# Patient Record
Sex: FEMALE | Race: WHITE
[De-identification: names, ages, dates, MRNs, and addresses within clinical notes are randomized per-mention and may not be internally consistent; named-entity substitution may affect disease eponyms.]

---

## 2017-09-05 ENCOUNTER — HOSPITAL ENCOUNTER (OUTPATIENT)
Dept: HOSPITAL 53 - M ADAMS | Age: 32
End: 2017-09-05
Attending: PHYSICIAN ASSISTANT
Payer: COMMERCIAL

## 2017-09-05 DIAGNOSIS — R59.0: Primary | ICD-10-CM

## 2017-09-05 DIAGNOSIS — M79.1: ICD-10-CM

## 2017-09-05 DIAGNOSIS — R53.83: ICD-10-CM

## 2017-09-05 LAB
ALBUMIN SERPL BCG-MCNC: 3.5 GM/DL (ref 3.2–5.2)
ALBUMIN/GLOB SERPL: 0.9 {RATIO} (ref 1–1.93)
ALP SERPL-CCNC: 78 U/L (ref 45–117)
ALT SERPL W P-5'-P-CCNC: 17 U/L (ref 12–78)
ANION GAP SERPL CALC-SCNC: 13 MEQ/L (ref 8–16)
AST SERPL-CCNC: 12 U/L (ref 15–37)
BASOPHILS # BLD AUTO: 0 K/MM3 (ref 0–0.2)
BASOPHILS NFR BLD AUTO: 0.3 % (ref 0–1)
BILIRUB SERPL-MCNC: 0.2 MG/DL (ref 0.2–1)
BUN SERPL-MCNC: 15 MG/DL (ref 7–18)
CALCIUM SERPL-MCNC: 8.5 MG/DL (ref 8.5–10.1)
CHLORIDE SERPL-SCNC: 106 MEQ/L (ref 98–107)
CO2 SERPL-SCNC: 24 MEQ/L (ref 21–32)
CREAT SERPL-MCNC: 0.74 MG/DL (ref 0.55–1.02)
EOSINOPHIL # BLD AUTO: 0 K/MM3 (ref 0–0.5)
EOSINOPHIL NFR BLD AUTO: 0.5 % (ref 0–3)
ERYTHROCYTE [DISTWIDTH] IN BLOOD BY AUTOMATED COUNT: 12.1 % (ref 11.5–14.5)
GFR SERPL CREATININE-BSD FRML MDRD: > 60 ML/MIN/{1.73_M2} (ref 60–?)
GLUCOSE SERPL-MCNC: 124 MG/DL (ref 70–105)
LARGE UNSTAINED CELL #: 0.1 K/MM3 (ref 0–0.4)
LARGE UNSTAINED CELL %: 1.3 % (ref 0–4)
LYMPHOCYTES # BLD AUTO: 1.9 K/MM3 (ref 1.5–4.5)
LYMPHOCYTES NFR BLD AUTO: 20.4 % (ref 24–44)
MCH RBC QN AUTO: 31 PG (ref 27–33)
MCHC RBC AUTO-ENTMCNC: 35.6 G/DL (ref 32–36.5)
MCV RBC AUTO: 87 FL (ref 80–96)
MONOCYTES # BLD AUTO: 0.4 K/MM3 (ref 0–0.8)
MONOCYTES NFR BLD AUTO: 4.3 % (ref 0–5)
NEUTROPHILS # BLD AUTO: 6.7 K/MM3 (ref 1.8–7.7)
NEUTROPHILS NFR BLD AUTO: 73.2 % (ref 36–66)
PLATELET # BLD AUTO: 262 K/MM3 (ref 150–450)
POTASSIUM SERPL-SCNC: 4 MEQ/L (ref 3.5–5.1)
PROT SERPL-MCNC: 7.4 GM/DL (ref 6.4–8.2)
SODIUM SERPL-SCNC: 143 MEQ/L (ref 136–145)
WBC # BLD AUTO: 9.1 K/MM3 (ref 4–10)

## 2017-09-06 LAB — CONTROL LINE MONO: (no result)

## 2017-09-08 LAB
B BURGDOR IGG+IGM SER-ACNC: <0.91 ISR (ref 0–0.9)
B BURGDOR IGM SER IA-ACNC: <0.8 INDEX (ref 0–0.79)

## 2017-09-10 ENCOUNTER — HOSPITAL ENCOUNTER (EMERGENCY)
Dept: HOSPITAL 53 - M ED | Age: 32
LOS: 1 days | Discharge: HOME | End: 2017-09-11
Payer: COMMERCIAL

## 2017-09-10 VITALS — BODY MASS INDEX: 28 KG/M2 | WEIGHT: 148.3 LBS | HEIGHT: 61 IN

## 2017-09-10 DIAGNOSIS — N39.0: Primary | ICD-10-CM

## 2017-09-11 VITALS — DIASTOLIC BLOOD PRESSURE: 88 MMHG | SYSTOLIC BLOOD PRESSURE: 123 MMHG

## 2017-11-12 ENCOUNTER — HOSPITAL ENCOUNTER (EMERGENCY)
Dept: HOSPITAL 53 - M ED | Age: 32
Discharge: HOME | End: 2017-11-12
Payer: COMMERCIAL

## 2017-11-12 VITALS — WEIGHT: 155.32 LBS | HEIGHT: 61 IN | BODY MASS INDEX: 29.32 KG/M2

## 2017-11-12 VITALS — SYSTOLIC BLOOD PRESSURE: 159 MMHG | DIASTOLIC BLOOD PRESSURE: 85 MMHG

## 2017-11-12 DIAGNOSIS — N10: Primary | ICD-10-CM

## 2017-11-12 DIAGNOSIS — N39.0: ICD-10-CM

## 2017-12-07 ENCOUNTER — HOSPITAL ENCOUNTER (OUTPATIENT)
Dept: HOSPITAL 53 - M SFHCADAM | Age: 32
End: 2017-12-07
Attending: PHYSICIAN ASSISTANT
Payer: COMMERCIAL

## 2017-12-07 DIAGNOSIS — R35.0: Primary | ICD-10-CM

## 2017-12-07 DIAGNOSIS — M54.5: ICD-10-CM

## 2018-01-18 ENCOUNTER — HOSPITAL ENCOUNTER (OUTPATIENT)
Dept: HOSPITAL 53 - M SFHCADAM | Age: 33
End: 2018-01-18
Attending: PHYSICIAN ASSISTANT
Payer: COMMERCIAL

## 2018-01-18 DIAGNOSIS — Z83.3: Primary | ICD-10-CM

## 2018-01-18 LAB
25(OH)D3 SERPL-MCNC: 12 NG/ML (ref 30–100)
ALBUMIN/GLOBULIN RATIO: 1 (ref 1–1.93)
ALBUMIN: 3.9 GM/DL (ref 3.2–5.2)
ALKALINE PHOSPHATASE: 76 U/L (ref 45–117)
ALT SERPL W P-5'-P-CCNC: 12 U/L (ref 12–78)
ANION GAP: 7 MEQ/L (ref 8–16)
AST SERPL-CCNC: 11 U/L (ref 7–37)
BASO #: 0 10^3/UL (ref 0–0.2)
BASO %: 0.1 % (ref 0–1)
BILIRUBIN,TOTAL: 0.3 MG/DL (ref 0.2–1)
BLOOD UREA NITROGEN: 10 MG/DL (ref 7–18)
CALCIUM LEVEL: 9 MG/DL (ref 8.5–10.1)
CARBON DIOXIDE LEVEL: 29 MEQ/L (ref 21–32)
CHLORIDE LEVEL: 104 MEQ/L (ref 98–107)
CHOLEST SERPL-MCNC: 204 MG/DL (ref ?–200)
CHOLESTEROL RISK RATIO: 3.92 (ref ?–5)
CREATININE FOR GFR: 0.74 MG/DL (ref 0.55–1.02)
EOS #: 0 10^3/UL (ref 0–0.5)
EOSINOPHIL NFR BLD AUTO: 0.6 % (ref 0–3)
FREE T4: 0.96 NG/DL (ref 0.76–1.46)
GFR SERPL CREATININE-BSD FRML MDRD: > 60 ML/MIN/{1.73_M2} (ref 60–?)
GLUCOSE, FASTING: 98 MG/DL (ref 70–105)
HDLC SERPL-MCNC: 52 MG/DL (ref 40–?)
HEMATOCRIT: 42.1 % (ref 36–47)
HEMOGLOBIN: 13.5 G/DL (ref 12–16)
IMMATURE GRANULOCYTE #: 0 10^3/UL (ref 0–0)
IMMATURE GRANULOCYTE %: 0.3 % (ref 0–0)
LDL CHOLESTEROL: 111.6 MG/DL (ref ?–100)
LYMPH #: 1.3 10^3/UL (ref 1.5–4.5)
LYMPH %: 19.2 % (ref 24–44)
MEAN CORPUSCULAR HEMOGLOBIN: 28.5 PG (ref 27–33)
MEAN CORPUSCULAR HGB CONC: 32.1 G/DL (ref 32–36.5)
MEAN CORPUSCULAR VOLUME: 88.8 FL (ref 80–96)
MONO #: 0.4 10^3/UL (ref 0–0.8)
MONO %: 5.1 % (ref 0–5)
NEUTROPHILS #: 5.1 10^3/UL (ref 1.8–7.7)
NEUTROPHILS %: 74.7 % (ref 36–66)
NONHDLC SERPL-MCNC: 152 MG/DL
NRBC BLD AUTO-RTO: 0 % (ref 0–0)
PLATELET COUNT, AUTOMATED: 315 10^3/UL (ref 150–450)
POTASSIUM SERUM: 4.2 MEQ/L (ref 3.5–5.1)
RED BLOOD COUNT: 4.74 10^6/UL (ref 4–5.4)
RED CELL DISTRIBUTION WIDTH: 11.9 % (ref 11.5–14.5)
SODIUM LEVEL: 140 MEQ/L (ref 136–145)
THYROID STIMULATING HORMONE: 1.03 UIU/ML (ref 0.36–3.74)
TOTAL PROTEIN: 7.8 GM/DL (ref 6.4–8.2)
TRIGLYCERIDES LEVEL: 202 MG/DL (ref ?–150)
WHITE BLOOD COUNT: 6.8 10^3/UL (ref 4–10)

## 2018-01-29 ENCOUNTER — HOSPITAL ENCOUNTER (OUTPATIENT)
Dept: HOSPITAL 53 - M RAD | Age: 33
End: 2018-01-29
Attending: PHYSICIAN ASSISTANT
Payer: COMMERCIAL

## 2018-01-29 DIAGNOSIS — R93.3: ICD-10-CM

## 2018-01-29 DIAGNOSIS — R13.10: Primary | ICD-10-CM

## 2018-01-29 PROCEDURE — 74241: CPT

## 2018-11-23 ENCOUNTER — HOSPITAL ENCOUNTER (OUTPATIENT)
Dept: HOSPITAL 53 - M LAB REF | Age: 33
End: 2018-11-23
Attending: PHYSICIAN ASSISTANT
Payer: COMMERCIAL

## 2018-11-23 DIAGNOSIS — N39.0: Primary | ICD-10-CM

## 2018-11-28 ENCOUNTER — HOSPITAL ENCOUNTER (EMERGENCY)
Dept: HOSPITAL 53 - M ED | Age: 33
Discharge: HOME | End: 2018-11-28
Payer: COMMERCIAL

## 2018-11-28 DIAGNOSIS — Z79.3: ICD-10-CM

## 2018-11-28 DIAGNOSIS — F41.1: ICD-10-CM

## 2018-11-28 DIAGNOSIS — K21.9: Primary | ICD-10-CM

## 2018-11-28 DIAGNOSIS — Z79.899: ICD-10-CM

## 2018-11-28 DIAGNOSIS — R00.0: ICD-10-CM

## 2018-11-28 LAB
ALBUMIN/GLOBULIN RATIO: 0.8 (ref 1–1.93)
ALBUMIN: 3.7 GM/DL (ref 3.2–5.2)
ALKALINE PHOSPHATASE: 72 U/L (ref 45–117)
ALT SERPL W P-5'-P-CCNC: 16 U/L (ref 12–78)
ANION GAP: 8 MEQ/L (ref 8–16)
AST SERPL-CCNC: 12 U/L (ref 7–37)
BASO #: 0 10^3/UL (ref 0–0.2)
BASO %: 0.3 % (ref 0–1)
BILIRUB CONJ SERPL-MCNC: < 0.1 MG/DL (ref 0–0.2)
BILIRUBIN,TOTAL: < 0.1 MG/DL (ref 0.2–1)
BLOOD UREA NITROGEN: 11 MG/DL (ref 7–18)
CALCIUM LEVEL: 9.1 MG/DL (ref 8.5–10.1)
CARBON DIOXIDE LEVEL: 24 MEQ/L (ref 21–32)
CHLORIDE LEVEL: 107 MEQ/L (ref 98–107)
CK MB CFR.DF SERPL CALC: 0.77
CK SERPL-CCNC: 130 U/L (ref 26–192)
CK-MB VALUE MASS: < 1 NG/ML (ref ?–3.6)
CREATININE FOR GFR: 0.73 MG/DL (ref 0.55–1.3)
EOS #: 0 10^3/UL (ref 0–0.5)
EOSINOPHIL NFR BLD AUTO: 0.1 % (ref 0–3)
GFR SERPL CREATININE-BSD FRML MDRD: > 60 ML/MIN/{1.73_M2} (ref 60–?)
GLUCOSE, FASTING: 128 MG/DL (ref 70–100)
HEMATOCRIT: 39.3 % (ref 36–47)
HEMOGLOBIN: 13.1 G/DL (ref 12–15.5)
IMMATURE GRANULOCYTE %: 0.4 % (ref 0–3)
LIPASE: 147 U/L (ref 73–393)
LYMPH #: 1.2 10^3/UL (ref 1.5–4.5)
LYMPH %: 15.4 % (ref 24–44)
MEAN CORPUSCULAR HEMOGLOBIN: 28.3 PG (ref 27–33)
MEAN CORPUSCULAR HGB CONC: 33.3 G/DL (ref 32–36.5)
MEAN CORPUSCULAR VOLUME: 84.9 FL (ref 80–96)
MONO #: 0.4 10^3/UL (ref 0–0.8)
MONO %: 5.4 % (ref 0–5)
NEUTROPHILS #: 6.2 10^3/UL (ref 1.8–7.7)
NEUTROPHILS %: 78.4 % (ref 36–66)
NRBC BLD AUTO-RTO: 0 % (ref 0–0)
PLATELET COUNT, AUTOMATED: 321 10^3/UL (ref 150–450)
POTASSIUM SERUM: 3.5 MEQ/L (ref 3.5–5.1)
RED BLOOD COUNT: 4.63 10^6/UL (ref 4–5.4)
RED CELL DISTRIBUTION WIDTH: 12.8 % (ref 11.5–14.5)
SODIUM LEVEL: 139 MEQ/L (ref 136–145)
TOTAL PROTEIN: 8.3 GM/DL (ref 6.4–8.2)
TROPONIN I: < 0.02 NG/ML (ref ?–0.1)
WHITE BLOOD COUNT: 7.9 10^3/UL (ref 4–10)

## 2018-11-28 PROCEDURE — 93005 ELECTROCARDIOGRAM TRACING: CPT

## 2018-12-06 ENCOUNTER — HOSPITAL ENCOUNTER (EMERGENCY)
Dept: HOSPITAL 53 - M ED | Age: 33
Discharge: HOME | End: 2018-12-06
Payer: COMMERCIAL

## 2018-12-06 DIAGNOSIS — F41.9: ICD-10-CM

## 2018-12-06 DIAGNOSIS — T43.025A: Primary | ICD-10-CM

## 2018-12-06 PROCEDURE — 99284 EMERGENCY DEPT VISIT MOD MDM: CPT

## 2019-02-05 ENCOUNTER — HOSPITAL ENCOUNTER (EMERGENCY)
Dept: HOSPITAL 53 - M ED | Age: 34
LOS: 1 days | Discharge: HOME | End: 2019-02-06
Payer: COMMERCIAL

## 2019-02-05 VITALS — HEIGHT: 61 IN | BODY MASS INDEX: 29.32 KG/M2 | WEIGHT: 155.32 LBS

## 2019-02-05 DIAGNOSIS — B34.9: ICD-10-CM

## 2019-02-05 DIAGNOSIS — R10.9: Primary | ICD-10-CM

## 2019-02-05 DIAGNOSIS — N83.202: ICD-10-CM

## 2019-02-05 LAB
ALBUMIN SERPL BCG-MCNC: 3.5 GM/DL (ref 3.2–5.2)
ALT SERPL W P-5'-P-CCNC: 22 U/L (ref 12–78)
APPEARANCE UR: (no result)
BACTERIA UR QL AUTO: NEGATIVE
BASOPHILS # BLD AUTO: 0 10^3/UL (ref 0–0.2)
BASOPHILS NFR BLD AUTO: 0.2 % (ref 0–1)
BILIRUB SERPL-MCNC: 0.3 MG/DL (ref 0.2–1)
BILIRUB UR QL STRIP.AUTO: NEGATIVE
BUN SERPL-MCNC: 12 MG/DL (ref 7–18)
CALCIUM SERPL-MCNC: 8.3 MG/DL (ref 8.5–10.1)
CHLORIDE SERPL-SCNC: 107 MEQ/L (ref 98–107)
CO2 SERPL-SCNC: 24 MEQ/L (ref 21–32)
CREAT SERPL-MCNC: 0.75 MG/DL (ref 0.55–1.3)
EOSINOPHIL # BLD AUTO: 0 10^3/UL (ref 0–0.5)
EOSINOPHIL NFR BLD AUTO: 0.1 % (ref 0–3)
FLUAV RNA UPPER RESP QL NAA+PROBE: NEGATIVE
FLUBV RNA UPPER RESP QL NAA+PROBE: NEGATIVE
GFR SERPL CREATININE-BSD FRML MDRD: > 60 ML/MIN/{1.73_M2} (ref 60–?)
GLUCOSE SERPL-MCNC: 118 MG/DL (ref 70–100)
GLUCOSE UR QL STRIP.AUTO: NEGATIVE MG/DL
HCG UR QL: NEGATIVE
HCT VFR BLD AUTO: 39.4 % (ref 36–47)
HGB BLD-MCNC: 12.9 G/DL (ref 12–15.5)
HGB UR QL STRIP.AUTO: NEGATIVE
KETONES UR QL STRIP.AUTO: (no result) MG/DL
LEUKOCYTE ESTERASE UR QL STRIP.AUTO: (no result)
LIPASE SERPL-CCNC: 163 U/L (ref 73–393)
LYMPHOCYTES # BLD AUTO: 0.8 10^3/UL (ref 1.5–4.5)
LYMPHOCYTES NFR BLD AUTO: 6 % (ref 24–44)
MCH RBC QN AUTO: 28 PG (ref 27–33)
MCHC RBC AUTO-ENTMCNC: 32.7 G/DL (ref 32–36.5)
MCV RBC AUTO: 85.7 FL (ref 80–96)
MONOCYTES # BLD AUTO: 0.4 10^3/UL (ref 0–0.8)
MONOCYTES NFR BLD AUTO: 2.8 % (ref 0–5)
MUCOUS THREADS URNS QL MICRO: (no result)
NEUTROPHILS # BLD AUTO: 11.5 10^3/UL (ref 1.8–7.7)
NEUTROPHILS NFR BLD AUTO: 90.5 % (ref 36–66)
NITRITE UR QL STRIP.AUTO: NEGATIVE
PH UR STRIP.AUTO: 5 UNITS (ref 5–9)
PLATELET # BLD AUTO: 302 10^3/UL (ref 150–450)
POTASSIUM SERPL-SCNC: 4.1 MEQ/L (ref 3.5–5.1)
PROT SERPL-MCNC: 7.6 GM/DL (ref 6.4–8.2)
PROT UR QL STRIP.AUTO: NEGATIVE MG/DL
RBC # BLD AUTO: 4.6 10^6/UL (ref 4–5.4)
RBC # UR AUTO: 1 /HPF (ref 0–3)
SODIUM SERPL-SCNC: 139 MEQ/L (ref 136–145)
SP GR UR STRIP.AUTO: 1.02 (ref 1–1.03)
SQUAMOUS #/AREA URNS AUTO: 3 /HPF (ref 0–6)
UROBILINOGEN UR QL STRIP.AUTO: 0.2 MG/DL (ref 0–2)
WBC # BLD AUTO: 12.7 10^3/UL (ref 4–10)
WBC #/AREA URNS AUTO: 7 /HPF (ref 0–3)

## 2019-02-05 PROCEDURE — 83690 ASSAY OF LIPASE: CPT

## 2019-02-05 PROCEDURE — 81001 URINALYSIS AUTO W/SCOPE: CPT

## 2019-02-05 PROCEDURE — 87880 STREP A ASSAY W/OPTIC: CPT

## 2019-02-05 PROCEDURE — 80053 COMPREHEN METABOLIC PANEL: CPT

## 2019-02-05 PROCEDURE — 74177 CT ABD & PELVIS W/CONTRAST: CPT

## 2019-02-05 PROCEDURE — 99284 EMERGENCY DEPT VISIT MOD MDM: CPT

## 2019-02-05 PROCEDURE — 85025 COMPLETE CBC W/AUTO DIFF WBC: CPT

## 2019-02-05 PROCEDURE — 96374 THER/PROPH/DIAG INJ IV PUSH: CPT

## 2019-02-05 PROCEDURE — 87502 INFLUENZA DNA AMP PROBE: CPT

## 2019-02-05 PROCEDURE — 84703 CHORIONIC GONADOTROPIN ASSAY: CPT

## 2019-02-06 VITALS — DIASTOLIC BLOOD PRESSURE: 55 MMHG | SYSTOLIC BLOOD PRESSURE: 110 MMHG

## 2019-02-06 NOTE — REPVR
EXAM: 

 CT Abdomen and Pelvis With Contrast 



EXAM DATE/TIME: 

 2/5/2019 10:21 PM 



CLINICAL HISTORY: 

 33 years old, female; Pain; Abdominal pain; Generalized; Additional info: 

Central abd pain, nvd x 5 days 



TECHNIQUE: 

 Axial computed tomography images of the abdomen and pelvis with intravenous 

contrast. 

 All CT scans at this facility use at least one of these dose optimization 

techniques: automated exposure control; mA and/or kV adjustment per patient 

size (includes targeted exams where dose is matched to clinical indication); or 

iterative reconstruction. 

 Coronal and sagittal reformatted images were created and reviewed. 



CONTRAST: 

 100 ml of iso administered intravenously. 



COMPARISON: 

 US OBS FOLLOW UP OR REPEAT 4/25/2014 10:39 AM 



FINDINGS: 

 Lower thorax:  No suspicious mass or airspace process in the visualized lung 

bases. 



ABDOMEN: 

 Liver:  Liver appears normal with no focal abnormality. 

 Gallbladder and bile ducts:  Gallbladder is present and shows no evidence of 

gallstone. 

 Pancreas:  Pancreas appears normal. No focal mass or peripancreatic 

inflammation. 

 Spleen:  Spleen appears homogeneous without focal mass. 

 Adrenals:  Adrenal glands are normal in appearance. 

 Kidneys and ureters:  Kidneys appear normal, with no stone, solid mass or 

hydronephrosis. 

 Stomach and bowel:  No evidence of small bowel obstruction. No evidence of 

acute diverticulitis. 

 Appendix:  Normal caliber appendix is identified, with no adjacent 

inflammation. 



PELVIS: 

 Bladder:  Bladder appears normal. 

 Reproductive:  Left ovarian cyst measuring 3.5 cm is present. 



ABDOMEN and PELVIS: 

 Intraperitoneal space:  No pneumoperitoneum. 

 Bones/joints:  Bony structures show no acute fracture or destructive process. 

 Soft tissues:  Fat containing umbilical hernia is present. No effacement of 

normal fat planes in the ischiorectal fossa. 

 Vasculature:  Main portal and splenic veins enhance normally. 

 Lymph nodes:  No enlarged lymph nodes. 



IMPRESSION: 

1. No acute bowel abnormality. 

2. Left ovarian cyst measuring 3.5 cm. No evidence of rupture 



Electronically signed by: Darius Salinas On 02/06/2019  00:29:30 AM

## 2019-06-05 ENCOUNTER — HOSPITAL ENCOUNTER (OUTPATIENT)
Dept: HOSPITAL 53 - M RAD | Age: 34
End: 2019-06-05
Attending: NURSE PRACTITIONER
Payer: COMMERCIAL

## 2019-06-05 DIAGNOSIS — D48.0: Primary | ICD-10-CM

## 2019-06-05 PROCEDURE — 72157 MRI CHEST SPINE W/O & W/DYE: CPT

## 2019-06-05 NOTE — REP
MR THORACIC SPINE WITHOUT AND WITH CONTRAST:

 

HISTORY:  Thoracic lesion.

 

CONTRAST:  ProHance 15 mL.

 

There is no disc bulge or herniation.  The spinal canal and the neural foramina

are patent.  The spinal cord is normal in signal intensity.  There is no abnormal

enhancement.  Hemangiomas are present in the T5, T7, and T10 vertebral bodies.

Normal signal intensity is present in the remaining thoracic vertebral bodies.

 

IMPRESSION:

 

There is no disc bulge or herniation.

 

 

Electronically Signed by

Adan Godoy MD 06/05/2019 11:10 A

## 2019-12-23 ENCOUNTER — HOSPITAL ENCOUNTER (OUTPATIENT)
Dept: HOSPITAL 53 - M ADAMS | Age: 34
End: 2019-12-23
Attending: PHYSICIAN ASSISTANT
Payer: COMMERCIAL

## 2019-12-23 DIAGNOSIS — R05: Primary | ICD-10-CM

## 2019-12-23 NOTE — REP
Clinical:  Cough.

 

Technique:  PA and lateral.

 

Comparison:  None.

 

Findings:

Coarsened markings suggest bronchitis.  No focal consolidation.  No effusion.  No

pneumothorax.  Mediastinum and cardiac silhouette normal.  Skeletal structures

are intact.

 

Impression:  Possible bronchitis.  No focal consolidation.

 

 

Electronically Signed by

Michael Adan MD 12/23/2019 02:44 P

## 2019-12-24 ENCOUNTER — HOSPITAL ENCOUNTER (OUTPATIENT)
Dept: HOSPITAL 53 - M LAB REF | Age: 34
End: 2019-12-24
Attending: PHYSICIAN ASSISTANT
Payer: COMMERCIAL

## 2019-12-24 DIAGNOSIS — R05: Primary | ICD-10-CM

## 2020-01-28 ENCOUNTER — HOSPITAL ENCOUNTER (OUTPATIENT)
Dept: HOSPITAL 53 - M LABDRWAD | Age: 35
End: 2020-01-28
Attending: NURSE PRACTITIONER
Payer: COMMERCIAL

## 2020-01-28 ENCOUNTER — HOSPITAL ENCOUNTER (OUTPATIENT)
Dept: HOSPITAL 53 - M ADAMS | Age: 35
End: 2020-01-28
Attending: NURSE PRACTITIONER
Payer: COMMERCIAL

## 2020-01-28 DIAGNOSIS — R06.02: Primary | ICD-10-CM

## 2020-01-28 LAB
BASOPHILS # BLD AUTO: 0 10^3/UL (ref 0–0.2)
BASOPHILS NFR BLD AUTO: 0.3 % (ref 0–1)
BUN SERPL-MCNC: 12 MG/DL (ref 7–18)
CALCIUM SERPL-MCNC: 8.4 MG/DL (ref 8.5–10.1)
CHLORIDE SERPL-SCNC: 107 MEQ/L (ref 98–107)
CK MB CFR.DF SERPL CALC: 0.62
CK MB SERPL-MCNC: 1.2 NG/ML (ref ?–3.6)
CK SERPL-CCNC: 195 U/L (ref 26–192)
CO2 SERPL-SCNC: 25 MEQ/L (ref 21–32)
CREAT SERPL-MCNC: 0.68 MG/DL (ref 0.55–1.3)
EOSINOPHIL # BLD AUTO: 0.1 10^3/UL (ref 0–0.5)
EOSINOPHIL NFR BLD AUTO: 0.7 % (ref 0–3)
GFR SERPL CREATININE-BSD FRML MDRD: > 60 ML/MIN/{1.73_M2} (ref 60–?)
GLUCOSE SERPL-MCNC: 93 MG/DL (ref 70–100)
HCT VFR BLD AUTO: 36.1 % (ref 36–47)
HGB BLD-MCNC: 11.3 G/DL (ref 12–15.5)
LYMPHOCYTES # BLD AUTO: 1.7 10^3/UL (ref 1.5–5)
LYMPHOCYTES NFR BLD AUTO: 17.3 % (ref 24–44)
MCH RBC QN AUTO: 26.4 PG (ref 27–33)
MCHC RBC AUTO-ENTMCNC: 31.3 G/DL (ref 32–36.5)
MCV RBC AUTO: 84.3 FL (ref 80–96)
MONOCYTES # BLD AUTO: 0.6 10^3/UL (ref 0–0.8)
MONOCYTES NFR BLD AUTO: 6.2 % (ref 0–5)
NEUTROPHILS # BLD AUTO: 7.5 10^3/UL (ref 1.5–8.5)
NEUTROPHILS NFR BLD AUTO: 75.1 % (ref 36–66)
PLATELET # BLD AUTO: 352 10^3/UL (ref 150–450)
POTASSIUM SERPL-SCNC: 4.1 MEQ/L (ref 3.5–5.1)
RBC # BLD AUTO: 4.28 10^6/UL (ref 4–5.4)
SODIUM SERPL-SCNC: 138 MEQ/L (ref 136–145)
TROPONIN I SERPL-MCNC: < 0.02 NG/ML (ref ?–0.1)
WBC # BLD AUTO: 9.9 10^3/UL (ref 4–10)

## 2020-01-29 NOTE — REP
PA and lateral chest:

Comparison is 12/23 / 2019 and chest CT dated 12/26/2016.

Lung fields are clear.  Cardiac size is normal.  The sammy, mediastinum, skeletal

structures are unremarkable.

There is no interval change.

Impression:

Negative PA and lateral chest.

 

 

 

Electronically Signed by

Yobani Cueva MD 01/28/2020 02:33 P

## 2020-09-10 ENCOUNTER — HOSPITAL ENCOUNTER (OUTPATIENT)
Dept: HOSPITAL 53 - M ADAMS | Age: 35
End: 2020-09-10
Attending: NURSE PRACTITIONER
Payer: COMMERCIAL

## 2020-09-10 DIAGNOSIS — M54.16: ICD-10-CM

## 2020-09-10 DIAGNOSIS — O26.899: ICD-10-CM

## 2020-09-10 DIAGNOSIS — O24.410: Primary | ICD-10-CM

## 2020-09-10 DIAGNOSIS — Z3A.00: ICD-10-CM

## 2020-09-10 DIAGNOSIS — M12.862: ICD-10-CM

## 2020-09-10 LAB
ALBUMIN SERPL BCG-MCNC: 3.4 GM/DL (ref 3.2–5.2)
ALT SERPL W P-5'-P-CCNC: 15 U/L (ref 12–78)
BASOPHILS # BLD AUTO: 0 10^3/UL (ref 0–0.2)
BASOPHILS NFR BLD AUTO: 0.3 % (ref 0–1)
BILIRUB SERPL-MCNC: 0.2 MG/DL (ref 0.2–1)
BUN SERPL-MCNC: 14 MG/DL (ref 7–18)
CALCIUM SERPL-MCNC: 9.1 MG/DL (ref 8.5–10.1)
CHLORIDE SERPL-SCNC: 106 MEQ/L (ref 98–107)
CHOLEST SERPL-MCNC: 198 MG/DL (ref ?–200)
CHOLEST/HDLC SERPL: 3.6 {RATIO} (ref ?–5)
CO2 SERPL-SCNC: 25 MEQ/L (ref 21–32)
CREAT SERPL-MCNC: 0.68 MG/DL (ref 0.55–1.3)
EOSINOPHIL # BLD AUTO: 0.1 10^3/UL (ref 0–0.5)
EOSINOPHIL NFR BLD AUTO: 1.7 % (ref 0–3)
GFR SERPL CREATININE-BSD FRML MDRD: > 60 ML/MIN/{1.73_M2} (ref 60–?)
GLUCOSE SERPL-MCNC: 126 MG/DL (ref 70–100)
HCT VFR BLD AUTO: 37.1 % (ref 36–47)
HDLC SERPL-MCNC: 55 MG/DL (ref 40–?)
HGB BLD-MCNC: 11.5 G/DL (ref 12–15.5)
LDLC SERPL CALC-MCNC: 87 MG/DL (ref ?–100)
LYMPHOCYTES # BLD AUTO: 1.2 10^3/UL (ref 1.5–5)
LYMPHOCYTES NFR BLD AUTO: 19.6 % (ref 24–44)
MCH RBC QN AUTO: 26.6 PG (ref 27–33)
MCHC RBC AUTO-ENTMCNC: 31 G/DL (ref 32–36.5)
MCV RBC AUTO: 85.7 FL (ref 80–96)
MONOCYTES # BLD AUTO: 0.4 10^3/UL (ref 0–0.8)
MONOCYTES NFR BLD AUTO: 6.5 % (ref 0–5)
NEUTROPHILS # BLD AUTO: 4.5 10^3/UL (ref 1.5–8.5)
NEUTROPHILS NFR BLD AUTO: 71.3 % (ref 36–66)
NONHDLC SERPL-MCNC: 143 MG/DL
PLATELET # BLD AUTO: 317 10^3/UL (ref 150–450)
POTASSIUM SERPL-SCNC: 4.4 MEQ/L (ref 3.5–5.1)
PROT SERPL-MCNC: 7.6 GM/DL (ref 6.4–8.2)
RBC # BLD AUTO: 4.33 10^6/UL (ref 4–5.4)
SODIUM SERPL-SCNC: 136 MEQ/L (ref 136–145)
TRIGL SERPL-MCNC: 279 MG/DL (ref ?–150)
TSH SERPL DL<=0.005 MIU/L-ACNC: 1.41 UIU/ML (ref 0.36–3.74)
WBC # BLD AUTO: 6.3 10^3/UL (ref 4–10)

## 2020-09-30 NOTE — REP
LEFT KNEE SERIES: 



CLINICAL: Pain.



TECHNIQUE: AP, lateral and sunrise views of the left knee.



FINDINGS:

Osseous structures, joint spaces and surrounding soft tissues are normal and age
appropriate. No acute fracture or dislocation. No overt arthritic changes. No 
obvious effusion.



IMPRESSION: 

Normal age appropriate left knee radiographs. 

MTDD

## 2021-03-19 ENCOUNTER — HOSPITAL ENCOUNTER (OUTPATIENT)
Dept: HOSPITAL 53 - M RAD | Age: 36
End: 2021-03-19
Attending: NURSE PRACTITIONER
Payer: COMMERCIAL

## 2021-03-19 DIAGNOSIS — I83.92: Primary | ICD-10-CM

## 2021-03-19 NOTE — REP
INDICATION:

LT LEG PAIN AND SWELLING.



COMPARISON:

None.



TECHNIQUE:

Left lower extremity duplex venous scanning.



FINDINGS:

The deep veins are anechoic and fully compressible from the groin to the popliteal

fossa in the left lower extremity.  Color flow imaging is homogeneous.  Spectral

Doppler interrogation demonstrates intact respiratory variation in flow and normal

manual augmentation of flow.  There is no evidence of deep vein thrombosis.





IMPRESSION:

Negative left lower extremity duplex venous ultrasound.  No evidence of deep vein

thrombosis.







<Electronically signed by Ruperto Jacques > 03/19/21 [id number]

## 2021-08-01 ENCOUNTER — HOSPITAL ENCOUNTER (EMERGENCY)
Dept: HOSPITAL 53 - M ED | Age: 36
Discharge: HOME | End: 2021-08-01
Payer: COMMERCIAL

## 2021-08-01 VITALS — HEIGHT: 61 IN | WEIGHT: 192.24 LBS | BODY MASS INDEX: 36.3 KG/M2

## 2021-08-01 VITALS — SYSTOLIC BLOOD PRESSURE: 138 MMHG | DIASTOLIC BLOOD PRESSURE: 92 MMHG

## 2021-08-01 DIAGNOSIS — J30.89: ICD-10-CM

## 2021-08-01 DIAGNOSIS — N30.91: Primary | ICD-10-CM

## 2021-08-01 DIAGNOSIS — Z79.899: ICD-10-CM

## 2021-08-01 DIAGNOSIS — K21.9: ICD-10-CM

## 2021-08-01 DIAGNOSIS — Z79.3: ICD-10-CM

## 2021-08-01 DIAGNOSIS — Z91.040: ICD-10-CM

## 2021-08-01 DIAGNOSIS — K44.9: ICD-10-CM

## 2021-08-01 LAB
ALBUMIN SERPL BCG-MCNC: 3.5 GM/DL (ref 3.2–5.2)
ALT SERPL W P-5'-P-CCNC: 18 U/L (ref 12–78)
BASOPHILS # BLD AUTO: 0 10^3/UL (ref 0–0.2)
BASOPHILS NFR BLD AUTO: 0.3 % (ref 0–1)
BILIRUB CONJ SERPL-MCNC: < 0.1 MG/DL (ref 0–0.2)
BILIRUB SERPL-MCNC: 0.3 MG/DL (ref 0.2–1)
EOSINOPHIL # BLD AUTO: 0.1 10^3/UL (ref 0–0.5)
EOSINOPHIL NFR BLD AUTO: 0.6 % (ref 0–3)
HCT VFR BLD AUTO: 41.4 % (ref 36–47)
HGB BLD-MCNC: 12.9 G/DL (ref 12–15.5)
LYMPHOCYTES # BLD AUTO: 2.2 10^3/UL (ref 1.5–5)
LYMPHOCYTES NFR BLD AUTO: 20.5 % (ref 24–44)
MCH RBC QN AUTO: 25.5 PG (ref 27–33)
MCHC RBC AUTO-ENTMCNC: 31.2 G/DL (ref 32–36.5)
MCV RBC AUTO: 82 FL (ref 80–96)
MONOCYTES # BLD AUTO: 0.5 10^3/UL (ref 0–0.8)
MONOCYTES NFR BLD AUTO: 4.9 % (ref 2–8)
N GONORRHOEA RRNA SPEC QL NAA+PROBE: NEGATIVE
NEUTROPHILS # BLD AUTO: 7.9 10^3/UL (ref 1.5–8.5)
NEUTROPHILS NFR BLD AUTO: 73.1 % (ref 36–66)
PLATELET # BLD AUTO: 379 10^3/UL (ref 150–450)
PROT SERPL-MCNC: 8.4 GM/DL (ref 6.4–8.2)
RBC # BLD AUTO: 5.05 10^6/UL (ref 4–5.4)
WBC # BLD AUTO: 10.7 10^3/UL (ref 4–10)

## 2021-08-01 PROCEDURE — 83605 ASSAY OF LACTIC ACID: CPT

## 2021-08-01 PROCEDURE — 80047 BASIC METABLC PNL IONIZED CA: CPT

## 2021-08-01 PROCEDURE — 99284 EMERGENCY DEPT VISIT MOD MDM: CPT

## 2021-08-01 PROCEDURE — 74177 CT ABD & PELVIS W/CONTRAST: CPT

## 2021-08-01 PROCEDURE — 81001 URINALYSIS AUTO W/SCOPE: CPT

## 2021-08-01 PROCEDURE — 36415 COLL VENOUS BLD VENIPUNCTURE: CPT

## 2021-08-01 PROCEDURE — 84702 CHORIONIC GONADOTROPIN TEST: CPT

## 2021-08-01 PROCEDURE — 87086 URINE CULTURE/COLONY COUNT: CPT

## 2021-08-01 PROCEDURE — 87661 TRICHOMONAS VAGINALIS AMPLIF: CPT

## 2021-08-01 PROCEDURE — 85025 COMPLETE CBC W/AUTO DIFF WBC: CPT

## 2021-08-01 PROCEDURE — 80076 HEPATIC FUNCTION PANEL: CPT

## 2021-08-01 NOTE — REP
INDICATION:

dysuria, hematuria, chills, concern for pyelo.



COMPARISON:

CT abdomen pelvis with IV contrast, 02/05/2019.



TECHNIQUE:

Imaging protocol: Computed tomography of the abdomen and pelvis with contrast.

Contiguous axial projection images were obtained through the abdomen and pelvis.

2D sagittal and coronal reconstructions were performed.

Radiation optimization: All CT scans at this facility use at least one of these

dose optimization techniques: automated exposure control; mA and/or kV

adjustment per patient size (includes targeted exams where dose is matched to

clinical indication); or iterative reconstruction.

Contrast material: ISOVUE 370; Contrast volume: 100 ml; Contrast route:

INTRAVENOUS (IV).



FINDINGS:

Heart and lung bases: The lung bases are clear.  There are no pleural effusions.  The

heart size is normal.  There is no pericardial effusion.



Liver: Normal.



Gallbladder: Normal.



Spleen: Normal.



Pancreas: Normal.



Adrenal glands: Normal.



Kidneys/bladder: The kidneys enhance normally.  Urinary bladder has a normal

unenhanced appearance.



Pelvic structures: The uterus and ovaries are normal.  There is no free fluid the

pelvis.  There is no pelvic or inguinal lymphadenopathy.



GI tract: There is a small hiatal hernia.  There is a normal appendix demonstrated.

The gastrointestinal tract is otherwise unremarkable.



Abdominal wall and mesentery: There is a 5 mm in diameter umbilical hernia containing

normal fat.  There is no mesenteric or retroperitoneal lymphadenopathy.



Abdominal aorta and vascular structures: The abdominal aorta, inferior vena cava, and

portal venous system normal.  There is a retroaortic left renal vein is a normal

variant.



Bony structures: There is minimal dextroscoliosis of the thoracolumbar spine.  The SI

joints and hips are normal.



IMPRESSION:

1.  The kidneys are normal.  There is no evidence of nephrolithiasis, ureterolithiasis

or hydronephrosis.

2.  There is a small hiatal hernia.

3.  There is an umbilical hernia containing normal fat.

4.  There is minimal dextroscoliosis of the thoracolumbar spine.





<Electronically signed by Fransisco Eaton > 08/01/21 [id number]

## 2021-09-20 ENCOUNTER — HOSPITAL ENCOUNTER (OUTPATIENT)
Dept: HOSPITAL 53 - M RAD | Age: 36
End: 2021-09-20
Attending: STUDENT IN AN ORGANIZED HEALTH CARE EDUCATION/TRAINING PROGRAM
Payer: COMMERCIAL

## 2021-09-20 DIAGNOSIS — N83.202: Primary | ICD-10-CM

## 2021-09-20 NOTE — REP
INDICATION:

R10.2- PELVIC AND PERINEAL PAIN.



COMPARISON:

None.



TECHNIQUE:

Transvaginal pelvic ultrasound performed.



FINDINGS:

Uterus measures 8.6 x 4.1 x 5.4 cm.  Distal acoustic shadowing is seen in the lower

uterine segment at the  section scar, somewhat limiting evaluation of the

underlying uterus.  Endometrial thickness is 5 mm.  No endometrial fluid collection is

seen.



Right ovary measures 2.9 x 2.4 x 2.4 cm.  A dominant follicle of the right ovary

measures 1.8 x 1.4 x 1.4 cm.  Left ovary measures 4.7 x 2.9 x 3.7 cm.  There is a

simple cyst of the left ovary 3.5 x 2.0 x 2.2 cm.  With duplex Doppler evaluation

normal blood flow is seen in each ovary with no torsion, RI right ovary 0.46 left

ovary 0.36.  No free fluid is seen.



IMPRESSION:

Simple cyst left ovary 3.5 x 2.0 x 2.2 cm.





<Electronically signed by Yobani Mcclure > [id number]

## 2022-05-06 ENCOUNTER — HOSPITAL ENCOUNTER (OUTPATIENT)
Dept: HOSPITAL 53 - M LABSMTC | Age: 37
End: 2022-05-06
Attending: ANESTHESIOLOGY
Payer: COMMERCIAL

## 2022-05-06 DIAGNOSIS — Z20.822: ICD-10-CM

## 2022-05-06 DIAGNOSIS — Z01.812: Primary | ICD-10-CM

## 2022-05-11 ENCOUNTER — HOSPITAL ENCOUNTER (OUTPATIENT)
Dept: HOSPITAL 53 - M SDC | Age: 37
Discharge: HOME | End: 2022-05-11
Attending: OBSTETRICS & GYNECOLOGY
Payer: COMMERCIAL

## 2022-05-11 VITALS — DIASTOLIC BLOOD PRESSURE: 89 MMHG | SYSTOLIC BLOOD PRESSURE: 129 MMHG

## 2022-05-11 VITALS — BODY MASS INDEX: 34.82 KG/M2 | HEIGHT: 61.5 IN | WEIGHT: 186.8 LBS

## 2022-05-11 DIAGNOSIS — Z30.2: Primary | ICD-10-CM

## 2022-05-11 DIAGNOSIS — F41.9: ICD-10-CM

## 2022-05-11 DIAGNOSIS — Z79.899: ICD-10-CM

## 2022-05-11 DIAGNOSIS — E11.9: ICD-10-CM

## 2022-05-11 DIAGNOSIS — J45.909: ICD-10-CM

## 2022-05-11 DIAGNOSIS — R12: ICD-10-CM

## 2022-05-11 LAB
HCT VFR BLD AUTO: 39.7 % (ref 36–47)
HGB BLD-MCNC: 13 G/DL (ref 12–15.5)
MCH RBC QN AUTO: 26.6 PG (ref 27–33)
MCHC RBC AUTO-ENTMCNC: 32.7 G/DL (ref 32–36.5)
MCV RBC AUTO: 81.4 FL (ref 80–96)
PLATELET # BLD AUTO: 346 10^3/UL (ref 150–450)
RBC # BLD AUTO: 4.88 10^6/UL (ref 4–5.4)
WBC # BLD AUTO: 10.5 10^3/UL (ref 4–10)

## 2022-05-11 PROCEDURE — 81025 URINE PREGNANCY TEST: CPT

## 2022-05-11 PROCEDURE — 86900 BLOOD TYPING SEROLOGIC ABO: CPT

## 2022-05-11 PROCEDURE — 88302 TISSUE EXAM BY PATHOLOGIST: CPT

## 2022-05-11 PROCEDURE — 86850 RBC ANTIBODY SCREEN: CPT

## 2022-05-11 PROCEDURE — 86901 BLOOD TYPING SEROLOGIC RH(D): CPT

## 2022-05-11 PROCEDURE — 36415 COLL VENOUS BLD VENIPUNCTURE: CPT

## 2022-05-11 PROCEDURE — 85027 COMPLETE CBC AUTOMATED: CPT

## 2022-05-11 PROCEDURE — 58661 LAPAROSCOPY REMOVE ADNEXA: CPT

## 2022-05-20 ENCOUNTER — HOSPITAL ENCOUNTER (OUTPATIENT)
Dept: HOSPITAL 53 - M RAD | Age: 37
End: 2022-05-20
Attending: PHYSICIAN ASSISTANT
Payer: COMMERCIAL

## 2022-05-20 DIAGNOSIS — M79.672: Primary | ICD-10-CM

## 2022-07-26 ENCOUNTER — HOSPITAL ENCOUNTER (OUTPATIENT)
Dept: HOSPITAL 53 - M RAD | Age: 37
End: 2022-07-26
Attending: NURSE PRACTITIONER
Payer: COMMERCIAL

## 2022-07-26 DIAGNOSIS — R10.31: Primary | ICD-10-CM

## 2022-07-26 LAB
ALBUMIN SERPL BCG-MCNC: 3.7 GM/DL (ref 3.2–5.2)
ALT SERPL W P-5'-P-CCNC: 28 U/L (ref 12–78)
B-HCG SERPL QL: NEGATIVE
BASOPHILS # BLD AUTO: 0 10^3/UL (ref 0–0.2)
BASOPHILS NFR BLD AUTO: 0.3 % (ref 0–1)
BILIRUB SERPL-MCNC: 0.2 MG/DL (ref 0.2–1)
BUN SERPL-MCNC: 12 MG/DL (ref 7–18)
CALCIUM SERPL-MCNC: 9.3 MG/DL (ref 8.5–10.1)
CHLORIDE SERPL-SCNC: 107 MEQ/L (ref 98–107)
CO2 SERPL-SCNC: 24 MEQ/L (ref 21–32)
CREAT SERPL-MCNC: 0.63 MG/DL (ref 0.55–1.3)
EOSINOPHIL # BLD AUTO: 0.1 10^3/UL (ref 0–0.5)
EOSINOPHIL NFR BLD AUTO: 1.2 % (ref 0–3)
GFR SERPL CREATININE-BSD FRML MDRD: > 60 ML/MIN/{1.73_M2} (ref 60–?)
GLUCOSE SERPL-MCNC: 133 MG/DL (ref 70–100)
HCT VFR BLD AUTO: 39.7 % (ref 36–47)
HGB BLD-MCNC: 12.7 G/DL (ref 12–15.5)
LYMPHOCYTES # BLD AUTO: 2.3 10^3/UL (ref 1.5–5)
LYMPHOCYTES NFR BLD AUTO: 23.7 % (ref 24–44)
MCH RBC QN AUTO: 26.8 PG (ref 27–33)
MCHC RBC AUTO-ENTMCNC: 32 G/DL (ref 32–36.5)
MCV RBC AUTO: 83.9 FL (ref 80–96)
MONOCYTES # BLD AUTO: 0.6 10^3/UL (ref 0–0.8)
MONOCYTES NFR BLD AUTO: 6.6 % (ref 2–8)
NEUTROPHILS # BLD AUTO: 6.4 10^3/UL (ref 1.5–8.5)
NEUTROPHILS NFR BLD AUTO: 67.4 % (ref 36–66)
PLATELET # BLD AUTO: 319 10^3/UL (ref 150–450)
POTASSIUM SERPL-SCNC: 4.5 MEQ/L (ref 3.5–5.1)
PROT SERPL-MCNC: 7.9 GM/DL (ref 6.4–8.2)
RBC # BLD AUTO: 4.73 10^6/UL (ref 4–5.4)
SODIUM SERPL-SCNC: 139 MEQ/L (ref 136–145)
WBC # BLD AUTO: 9.5 10^3/UL (ref 4–10)

## 2022-07-26 PROCEDURE — 36415 COLL VENOUS BLD VENIPUNCTURE: CPT

## 2022-07-26 PROCEDURE — 85025 COMPLETE CBC W/AUTO DIFF WBC: CPT

## 2022-07-26 PROCEDURE — 84703 CHORIONIC GONADOTROPIN ASSAY: CPT

## 2022-07-26 PROCEDURE — 74177 CT ABD & PELVIS W/CONTRAST: CPT

## 2022-07-26 PROCEDURE — 80053 COMPREHEN METABOLIC PANEL: CPT

## 2024-11-07 ENCOUNTER — HOSPITAL ENCOUNTER (EMERGENCY)
Dept: HOSPITAL 53 - M ED | Age: 39
Discharge: HOME | End: 2024-11-07
Payer: COMMERCIAL

## 2024-11-07 VITALS — HEIGHT: 61 IN | BODY MASS INDEX: 31.8 KG/M2 | WEIGHT: 168.43 LBS

## 2024-11-07 VITALS — TEMPERATURE: 96.3 F

## 2024-11-07 VITALS — OXYGEN SATURATION: 96 % | SYSTOLIC BLOOD PRESSURE: 124 MMHG | DIASTOLIC BLOOD PRESSURE: 89 MMHG

## 2024-11-07 DIAGNOSIS — F41.9: ICD-10-CM

## 2024-11-07 DIAGNOSIS — Z79.899: ICD-10-CM

## 2024-11-07 DIAGNOSIS — Z91.040: ICD-10-CM

## 2024-11-07 DIAGNOSIS — Z79.4: ICD-10-CM

## 2024-11-07 DIAGNOSIS — E11.9: ICD-10-CM

## 2024-11-07 DIAGNOSIS — Z91.09: ICD-10-CM

## 2024-11-07 DIAGNOSIS — K21.9: ICD-10-CM

## 2024-11-07 DIAGNOSIS — K29.00: Primary | ICD-10-CM

## 2024-11-07 LAB
ALBUMIN SERPL BCG-MCNC: 3.6 G/DL (ref 3.2–5.2)
ALP SERPL-CCNC: 100 U/L (ref 35–104)
ALT SERPL W P-5'-P-CCNC: 16 U/L (ref 7–40)
AST SERPL-CCNC: 14 U/L (ref ?–34)
BASOPHILS # BLD AUTO: 0 10^3/UL (ref 0–0.2)
BASOPHILS NFR BLD AUTO: 0.2 % (ref 0–1)
BILIRUB CONJ SERPL-MCNC: < 0.1 MG/DL (ref ?–0.4)
BILIRUB SERPL-MCNC: 0.4 MG/DL (ref 0.3–1.2)
BUN SERPL-MCNC: 14 MG/DL (ref 9–23)
CALCIUM SERPL-MCNC: 9.2 MG/DL (ref 8.5–10.1)
CHLORIDE SERPL-SCNC: 104 MMOL/L (ref 98–107)
CK MB CFR.DF SERPL CALC: 0.9
CK MB SERPL-MCNC: < 1 NG/ML (ref ?–3.6)
CK SERPL-CCNC: 111 U/L (ref 34–145)
CO2 SERPL-SCNC: 24 MMOL/L (ref 20–31)
CREAT SERPL-MCNC: 0.54 MG/DL (ref 0.55–1.3)
EOSINOPHIL # BLD AUTO: 0.1 10^3/UL (ref 0–0.5)
EOSINOPHIL NFR BLD AUTO: 1.6 % (ref 0–3)
GFR SERPL CREATININE-BSD FRML MDRD: > 60 ML/MIN/{1.73_M2} (ref 60–?)
GLUCOSE SERPL-MCNC: 187 MG/DL (ref 60–100)
HCT VFR BLD AUTO: 38.2 % (ref 36–47)
HGB BLD-MCNC: 13.1 G/DL (ref 12–15.5)
LIPASE SERPL-CCNC: 51 U/L (ref 12–53)
LYMPHOCYTES # BLD AUTO: 1.6 10^3/UL (ref 1.5–5)
LYMPHOCYTES NFR BLD AUTO: 19.3 % (ref 24–44)
MCH RBC QN AUTO: 29.4 PG (ref 27–33)
MCHC RBC AUTO-ENTMCNC: 34.3 G/DL (ref 32–36.5)
MCV RBC AUTO: 85.7 FL (ref 80–96)
MONOCYTES # BLD AUTO: 0.6 10^3/UL (ref 0–0.8)
MONOCYTES NFR BLD AUTO: 6.8 % (ref 2–8)
NEUTROPHILS # BLD AUTO: 5.8 10^3/UL (ref 1.5–8.5)
NEUTROPHILS NFR BLD AUTO: 71.7 % (ref 36–66)
PLATELET # BLD AUTO: 303 10^3/UL (ref 150–450)
POTASSIUM SERPL-SCNC: 4 MMOL/L (ref 3.5–5.1)
PROT SERPL-MCNC: 7.5 G/DL (ref 5.7–8.2)
RBC # BLD AUTO: 4.46 10^6/UL (ref 4–5.4)
SODIUM SERPL-SCNC: 137 MMOL/L (ref 136–145)
WBC # BLD AUTO: 8.1 10^3/UL (ref 4–10)

## 2024-11-07 PROCEDURE — 96374 THER/PROPH/DIAG INJ IV PUSH: CPT

## 2024-11-07 PROCEDURE — 82550 ASSAY OF CK (CPK): CPT

## 2024-11-07 PROCEDURE — 74177 CT ABD & PELVIS W/CONTRAST: CPT

## 2024-11-07 PROCEDURE — 80048 BASIC METABOLIC PNL TOTAL CA: CPT

## 2024-11-07 PROCEDURE — 80076 HEPATIC FUNCTION PANEL: CPT

## 2024-11-07 PROCEDURE — 81001 URINALYSIS AUTO W/SCOPE: CPT

## 2024-11-07 PROCEDURE — 85025 COMPLETE CBC W/AUTO DIFF WBC: CPT

## 2024-11-07 PROCEDURE — 83690 ASSAY OF LIPASE: CPT

## 2024-11-07 PROCEDURE — 93005 ELECTROCARDIOGRAM TRACING: CPT

## 2024-11-07 PROCEDURE — 99284 EMERGENCY DEPT VISIT MOD MDM: CPT

## 2024-11-07 PROCEDURE — 82553 CREATINE MB FRACTION: CPT

## 2024-11-07 PROCEDURE — 96375 TX/PRO/DX INJ NEW DRUG ADDON: CPT

## 2024-11-07 PROCEDURE — 84484 ASSAY OF TROPONIN QUANT: CPT

## 2024-11-07 RX ADMIN — DEXTROSE MONOHYDRATE ONE MG: 50 INJECTION, SOLUTION INTRAVENOUS at 06:54

## 2024-11-07 RX ADMIN — SUCRALFATE ONE GM: 1 SUSPENSION ORAL at 09:24

## 2024-11-07 RX ADMIN — METOCLOPRAMIDE ONE MG: 5 INJECTION, SOLUTION INTRAMUSCULAR; INTRAVENOUS at 07:48

## 2024-11-09 ENCOUNTER — HOSPITAL ENCOUNTER (EMERGENCY)
Dept: HOSPITAL 53 - M ED | Age: 39
LOS: 1 days | Discharge: HOME | End: 2024-11-10
Payer: COMMERCIAL

## 2024-11-09 VITALS — HEIGHT: 61 IN | WEIGHT: 165.13 LBS | BODY MASS INDEX: 31.18 KG/M2

## 2024-11-09 VITALS — TEMPERATURE: 97.9 F

## 2024-11-09 DIAGNOSIS — Q40.1: Primary | ICD-10-CM

## 2024-11-09 DIAGNOSIS — F41.9: ICD-10-CM

## 2024-11-09 DIAGNOSIS — Z79.4: ICD-10-CM

## 2024-11-09 DIAGNOSIS — K21.9: ICD-10-CM

## 2024-11-09 DIAGNOSIS — J45.909: ICD-10-CM

## 2024-11-09 DIAGNOSIS — Z91.040: ICD-10-CM

## 2024-11-09 DIAGNOSIS — Z91.09: ICD-10-CM

## 2024-11-09 DIAGNOSIS — Z79.899: ICD-10-CM

## 2024-11-09 LAB
ALBUMIN SERPL BCG-MCNC: 3.7 G/DL (ref 3.2–5.2)
ALP SERPL-CCNC: 90 U/L (ref 35–104)
ALT SERPL W P-5'-P-CCNC: 18 U/L (ref 7–40)
AST SERPL-CCNC: 36 U/L (ref ?–34)
B-HCG SERPL QL: NEGATIVE
BASOPHILS # BLD AUTO: 0 10^3/UL (ref 0–0.2)
BASOPHILS NFR BLD AUTO: 0.2 % (ref 0–1)
BILIRUB CONJ SERPL-MCNC: < 0.1 MG/DL (ref ?–0.4)
BILIRUB SERPL-MCNC: 0.4 MG/DL (ref 0.3–1.2)
BUN SERPL-MCNC: 9 MG/DL (ref 9–23)
CALCIUM SERPL-MCNC: 9.4 MG/DL (ref 8.5–10.1)
CHLORIDE SERPL-SCNC: 109 MMOL/L (ref 98–107)
CO2 SERPL-SCNC: 19 MMOL/L (ref 20–31)
CREAT SERPL-MCNC: 0.54 MG/DL (ref 0.55–1.3)
EOSINOPHIL # BLD AUTO: 0.1 10^3/UL (ref 0–0.5)
EOSINOPHIL NFR BLD AUTO: 0.8 % (ref 0–3)
GFR SERPL CREATININE-BSD FRML MDRD: > 60 ML/MIN/{1.73_M2} (ref 60–?)
GLUCOSE SERPL-MCNC: 144 MG/DL (ref 60–100)
HCT VFR BLD AUTO: 37.4 % (ref 36–47)
HGB BLD-MCNC: 12.8 G/DL (ref 12–15.5)
LIPASE SERPL-CCNC: 45 U/L (ref 12–53)
LYMPHOCYTES # BLD AUTO: 1.8 10^3/UL (ref 1.5–5)
LYMPHOCYTES NFR BLD AUTO: 21.7 % (ref 24–44)
MCH RBC QN AUTO: 28.7 PG (ref 27–33)
MCHC RBC AUTO-ENTMCNC: 34.2 G/DL (ref 32–36.5)
MCV RBC AUTO: 83.9 FL (ref 80–96)
MONOCYTES # BLD AUTO: 0.6 10^3/UL (ref 0–0.8)
MONOCYTES NFR BLD AUTO: 7.6 % (ref 2–8)
NEUTROPHILS # BLD AUTO: 5.7 10^3/UL (ref 1.5–8.5)
NEUTROPHILS NFR BLD AUTO: 69.3 % (ref 36–66)
PLATELET # BLD AUTO: (no result) 10^3/UL (ref 150–450)
POTASSIUM SERPL-SCNC: 4.2 MMOL/L (ref 3.5–5.1)
PROT SERPL-MCNC: 7.3 G/DL (ref 5.7–8.2)
RBC # BLD AUTO: 4.46 10^6/UL (ref 4–5.4)
SODIUM SERPL-SCNC: 139 MMOL/L (ref 136–145)
WBC # BLD AUTO: 8.3 10^3/UL (ref 4–10)

## 2024-11-09 PROCEDURE — 74177 CT ABD & PELVIS W/CONTRAST: CPT

## 2024-11-09 PROCEDURE — 83690 ASSAY OF LIPASE: CPT

## 2024-11-09 PROCEDURE — 96375 TX/PRO/DX INJ NEW DRUG ADDON: CPT

## 2024-11-09 PROCEDURE — 96374 THER/PROPH/DIAG INJ IV PUSH: CPT

## 2024-11-09 PROCEDURE — 84703 CHORIONIC GONADOTROPIN ASSAY: CPT

## 2024-11-09 PROCEDURE — 80076 HEPATIC FUNCTION PANEL: CPT

## 2024-11-09 PROCEDURE — 80048 BASIC METABOLIC PNL TOTAL CA: CPT

## 2024-11-09 PROCEDURE — 85025 COMPLETE CBC W/AUTO DIFF WBC: CPT

## 2024-11-09 PROCEDURE — 99284 EMERGENCY DEPT VISIT MOD MDM: CPT

## 2024-11-09 RX ADMIN — ONDANSETRON ONE MG: 2 INJECTION INTRAMUSCULAR; INTRAVENOUS at 23:29

## 2024-11-09 RX ADMIN — DEXTROSE MONOHYDRATE ONE MG: 50 INJECTION, SOLUTION INTRAVENOUS at 23:29

## 2024-11-10 VITALS — SYSTOLIC BLOOD PRESSURE: 128 MMHG | DIASTOLIC BLOOD PRESSURE: 86 MMHG

## 2024-11-10 VITALS — OXYGEN SATURATION: 97 %

## 2024-11-10 RX ADMIN — LORAZEPAM STA MG: 2 INJECTION INTRAMUSCULAR; INTRAVENOUS at 01:07

## 2025-05-02 ENCOUNTER — HOSPITAL ENCOUNTER (EMERGENCY)
Dept: HOSPITAL 53 - M ED | Age: 40
LOS: 1 days | Discharge: HOME | End: 2025-05-03
Payer: COMMERCIAL

## 2025-05-02 ENCOUNTER — HOSPITAL ENCOUNTER (EMERGENCY)
Dept: HOSPITAL 53 - M ED | Age: 40
Discharge: HOME | End: 2025-05-02
Payer: COMMERCIAL

## 2025-05-02 VITALS — OXYGEN SATURATION: 99 % | TEMPERATURE: 97.4 F | SYSTOLIC BLOOD PRESSURE: 124 MMHG | DIASTOLIC BLOOD PRESSURE: 85 MMHG

## 2025-05-02 DIAGNOSIS — E11.65: Primary | ICD-10-CM

## 2025-05-02 DIAGNOSIS — Z91.09: ICD-10-CM

## 2025-05-02 DIAGNOSIS — Z91.040: ICD-10-CM

## 2025-05-02 DIAGNOSIS — Z79.899: ICD-10-CM

## 2025-05-02 DIAGNOSIS — Z79.51: ICD-10-CM

## 2025-05-02 DIAGNOSIS — Z79.4: ICD-10-CM

## 2025-05-02 LAB
ALBUMIN SERPL BCG-MCNC: 3.6 G/DL (ref 3.2–5.2)
ALP SERPL-CCNC: 83 U/L (ref 35–104)
ALT SERPL W P-5'-P-CCNC: 19 U/L (ref 7–40)
AST SERPL-CCNC: < 8 U/L (ref ?–34)
B-OH-BUTYR SERPL-MCNC: 0.27 MMOL/L (ref 0.02–0.27)
BASE EXCESS BLDV CALC-SCNC: 0.5 MMOL/L (ref -2–2)
BASOPHILS NFR BLD AUTO: 0.3 % (ref 0–1)
BILIRUB CONJ SERPL-MCNC: < 0.1 MG/DL (ref ?–0.4)
BILIRUB SERPL-MCNC: 0.4 MG/DL (ref 0.3–1.2)
BUN SERPL-MCNC: 12 MG/DL (ref 9–23)
CALCIUM SERPL-MCNC: 8.7 MG/DL (ref 8.5–10.1)
CHLORIDE SERPL-SCNC: 99 MMOL/L (ref 98–107)
CO2 BLDV CALC-SCNC: 26.9 MMOL/L (ref 24–28)
CO2 SERPL-SCNC: 26 MMOL/L (ref 20–31)
CREAT SERPL-MCNC: 0.55 MG/DL (ref 0.55–1.3)
EOSINOPHIL # BLD AUTO: 0.1 10^3/UL (ref 0–0.5)
EOSINOPHIL NFR BLD AUTO: 1.2 % (ref 0–3)
GFR SERPL CREATININE-BSD FRML MDRD: > 90 ML/MIN/{1.73_M2} (ref 60–?)
GLUCOSE SERPL-MCNC: 348 MG/DL (ref 60–100)
HCO3 BLDV-SCNC: 25.6 MMOL/L (ref 23–27)
HCO3 STD BLDV-SCNC: 23.9 MMOL/L
HCT VFR BLD AUTO: 40.7 % (ref 36–47)
HGB BLD-MCNC: 13.7 G/DL (ref 12–15.5)
KETONES UR STRIP.AUTO-MCNC: (no result) MG/DL
LEUKOCYTE ESTERASE UR QL STRIP.AUTO: (no result)
LIPASE SERPL-CCNC: 47 U/L (ref 12–53)
LYMPHOCYTES # BLD AUTO: 1.6 10^3/UL (ref 1.5–5)
LYMPHOCYTES NFR BLD AUTO: 23.3 % (ref 24–44)
MCH RBC QN AUTO: 27.7 PG (ref 27–33)
MCHC RBC AUTO-ENTMCNC: 33.7 G/DL (ref 32–36.5)
MCV RBC AUTO: 82.4 FL (ref 80–96)
MONOCYTES # BLD AUTO: 0.4 10^3/UL (ref 0–0.8)
MONOCYTES NFR BLD AUTO: 5.2 % (ref 2–8)
NEUTROPHILS # BLD AUTO: 4.7 10^3/UL (ref 1.5–8.5)
NEUTROPHILS NFR BLD AUTO: 69.4 % (ref 36–66)
NITRITE UR QL STRIP.AUTO: NEGATIVE
OSMOLALITY SERPL: 292 MOSM/KG (ref 275–295)
PCO2 BLDV: 42.8 MMHG (ref 38–50)
PH BLDV: 7.39 UNITS (ref 7.33–7.43)
PLATELET # BLD AUTO: 288 10^3/UL (ref 150–450)
PO2 BLDV: 28.9 MMHG (ref 30–50)
POTASSIUM SERPL-SCNC: 4.2 MMOL/L (ref 3.5–5.1)
RBC # BLD AUTO: 4.94 10^6/UL (ref 4–5.4)
RBC # UR AUTO: 5 /HPF (ref 0–3)
SAO2 % BLDV: 54.8 % (ref 60–80)
SODIUM SERPL-SCNC: 132 MMOL/L (ref 136–145)
SQUAMOUS UR QL AUTO: 5 /HPF (ref 0–6)
WBC # BLD AUTO: 6.7 10^3/UL (ref 4–10)
WBC UR QL AUTO: 6 /HPF (ref 0–3)

## 2025-05-02 PROCEDURE — 96361 HYDRATE IV INFUSION ADD-ON: CPT

## 2025-05-02 PROCEDURE — 82550 ASSAY OF CK (CPK): CPT

## 2025-05-02 PROCEDURE — 87086 URINE CULTURE/COLONY COUNT: CPT

## 2025-05-02 PROCEDURE — 94760 N-INVAS EAR/PLS OXIMETRY 1: CPT

## 2025-05-02 PROCEDURE — 93005 ELECTROCARDIOGRAM TRACING: CPT

## 2025-05-02 PROCEDURE — 96372 THER/PROPH/DIAG INJ SC/IM: CPT

## 2025-05-02 PROCEDURE — 80076 HEPATIC FUNCTION PANEL: CPT

## 2025-05-02 PROCEDURE — 82803 BLOOD GASES ANY COMBINATION: CPT

## 2025-05-02 PROCEDURE — 81001 URINALYSIS AUTO W/SCOPE: CPT

## 2025-05-02 PROCEDURE — 83930 ASSAY OF BLOOD OSMOLALITY: CPT

## 2025-05-02 PROCEDURE — 99285 EMERGENCY DEPT VISIT HI MDM: CPT

## 2025-05-02 PROCEDURE — 85025 COMPLETE CBC W/AUTO DIFF WBC: CPT

## 2025-05-02 PROCEDURE — 84484 ASSAY OF TROPONIN QUANT: CPT

## 2025-05-02 PROCEDURE — 93041 RHYTHM ECG TRACING: CPT

## 2025-05-02 PROCEDURE — 80048 BASIC METABOLIC PNL TOTAL CA: CPT

## 2025-05-02 PROCEDURE — 82010 KETONE BODYS QUAN: CPT

## 2025-05-02 PROCEDURE — 83036 HEMOGLOBIN GLYCOSYLATED A1C: CPT

## 2025-05-02 PROCEDURE — 83690 ASSAY OF LIPASE: CPT

## 2025-05-02 PROCEDURE — 82077 ASSAY SPEC XCP UR&BREATH IA: CPT

## 2025-05-02 PROCEDURE — 82553 CREATINE MB FRACTION: CPT

## 2025-05-03 VITALS — TEMPERATURE: 98.6 F | DIASTOLIC BLOOD PRESSURE: 73 MMHG | SYSTOLIC BLOOD PRESSURE: 130 MMHG

## 2025-05-03 VITALS — OXYGEN SATURATION: 98 %

## 2025-05-03 LAB
ALBUMIN SERPL BCG-MCNC: 3.3 G/DL (ref 3.2–5.2)
ALP SERPL-CCNC: 83 U/L (ref 35–104)
ALT SERPL W P-5'-P-CCNC: 17 U/L (ref 7–40)
AST SERPL-CCNC: 11 U/L (ref ?–34)
BASE EXCESS BLDV CALC-SCNC: -3.4 MMOL/L (ref -2–2)
BASOPHILS NFR BLD AUTO: 0.1 % (ref 0–1)
BILIRUB CONJ SERPL-MCNC: < 0.1 MG/DL (ref ?–0.4)
BILIRUB SERPL-MCNC: 0.2 MG/DL (ref 0.3–1.2)
BUN SERPL-MCNC: 14 MG/DL (ref 9–23)
CALCIUM SERPL-MCNC: 7.9 MG/DL (ref 8.5–10.1)
CHLORIDE SERPL-SCNC: 102 MMOL/L (ref 98–107)
CK MB CFR.DF SERPL CALC: 1.38
CK MB CFR.DF SERPL CALC: 1.92
CK MB SERPL-MCNC: < 1 NG/ML (ref ?–3.6)
CK MB SERPL-MCNC: < 1 NG/ML (ref ?–3.6)
CK SERPL-CCNC: 52 U/L (ref 34–145)
CK SERPL-CCNC: 72 U/L (ref 34–145)
CO2 SERPL-SCNC: 24 MMOL/L (ref 20–31)
CREAT SERPL-MCNC: 0.52 MG/DL (ref 0.55–1.3)
EOSINOPHIL # BLD AUTO: 0.1 10^3/UL (ref 0–0.5)
EOSINOPHIL NFR BLD AUTO: 1.1 % (ref 0–3)
ETHANOL SERPL-MCNC: < 0.003 % (ref 0–0.01)
GFR SERPL CREATININE-BSD FRML MDRD: > 90 ML/MIN/{1.73_M2} (ref 60–?)
GLUCOSE SERPL-MCNC: 456 MG/DL (ref 60–100)
HCO3 STD BLDV-SCNC: 21.5 MMOL/L
HCT VFR BLD AUTO: 36.7 % (ref 36–47)
KETONES UR STRIP.AUTO-MCNC: (no result) MG/DL
LEUKOCYTE ESTERASE UR QL STRIP.AUTO: (no result)
LIPASE SERPL-CCNC: 66 U/L (ref 12–53)
LYMPHOCYTES # BLD AUTO: 1.9 10^3/UL (ref 1.5–5)
LYMPHOCYTES NFR BLD AUTO: 20.5 % (ref 24–44)
MCH RBC QN AUTO: 27.4 PG (ref 27–33)
MCHC RBC AUTO-ENTMCNC: 32.7 G/DL (ref 32–36.5)
MCV RBC AUTO: 83.8 FL (ref 80–96)
MONOCYTES # BLD AUTO: 0.5 10^3/UL (ref 0–0.8)
MONOCYTES NFR BLD AUTO: 5.5 % (ref 2–8)
NEUTROPHILS # BLD AUTO: 6.7 10^3/UL (ref 1.5–8.5)
NEUTROPHILS NFR BLD AUTO: 72.4 % (ref 36–66)
NITRITE UR QL STRIP.AUTO: NEGATIVE
OSMOLALITY SERPL: 300 MOSM/KG (ref 275–295)
PCO2 BLDV: 35.5 MMHG (ref 38–50)
PH BLDV: 7.39 UNITS (ref 7.33–7.43)
PLATELET # BLD AUTO: 270 10^3/UL (ref 150–450)
POTASSIUM SERPL-SCNC: 3.7 MMOL/L (ref 3.5–5.1)
PROT SERPL-MCNC: 6.4 G/DL (ref 5.7–8.2)
RBC # BLD AUTO: 4.38 10^6/UL (ref 4–5.4)
RBC # UR AUTO: 2 /HPF (ref 0–3)
SAO2 % BLDV: 89.3 % (ref 60–80)
SODIUM SERPL-SCNC: 134 MMOL/L (ref 136–145)
SQUAMOUS UR QL AUTO: 1 /HPF (ref 0–6)
WBC # BLD AUTO: 9.3 10^3/UL (ref 4–10)
WBC UR QL AUTO: 5 /HPF (ref 0–3)